# Patient Record
Sex: MALE
[De-identification: names, ages, dates, MRNs, and addresses within clinical notes are randomized per-mention and may not be internally consistent; named-entity substitution may affect disease eponyms.]

---

## 2019-10-28 PROBLEM — Z00.00 ENCOUNTER FOR PREVENTIVE HEALTH EXAMINATION: Status: ACTIVE | Noted: 2019-10-28

## 2019-11-14 ENCOUNTER — APPOINTMENT (OUTPATIENT)
Dept: SURGERY | Facility: CLINIC | Age: 54
End: 2019-11-14
Payer: COMMERCIAL

## 2019-11-14 VITALS
HEIGHT: 65 IN | WEIGHT: 152 LBS | DIASTOLIC BLOOD PRESSURE: 81 MMHG | BODY MASS INDEX: 25.33 KG/M2 | HEART RATE: 62 BPM | SYSTOLIC BLOOD PRESSURE: 123 MMHG

## 2019-11-14 DIAGNOSIS — Z87.09 PERSONAL HISTORY OF OTHER DISEASES OF THE RESPIRATORY SYSTEM: ICD-10-CM

## 2019-11-14 DIAGNOSIS — Z78.9 OTHER SPECIFIED HEALTH STATUS: ICD-10-CM

## 2019-11-14 DIAGNOSIS — Z80.52 FAMILY HISTORY OF MALIGNANT NEOPLASM OF BLADDER: ICD-10-CM

## 2019-11-14 DIAGNOSIS — Z86.39 PERSONAL HISTORY OF OTHER ENDOCRINE, NUTRITIONAL AND METABOLIC DISEASE: ICD-10-CM

## 2019-11-14 PROCEDURE — 99203 OFFICE O/P NEW LOW 30 MIN: CPT

## 2019-11-14 PROCEDURE — 36415 COLL VENOUS BLD VENIPUNCTURE: CPT

## 2019-11-14 RX ORDER — MONTELUKAST SODIUM 10 MG/1
10 TABLET, FILM COATED ORAL
Refills: 0 | Status: ACTIVE | COMMUNITY

## 2019-11-14 RX ORDER — BUDESONIDE AND FORMOTEROL FUMARATE DIHYDRATE 160; 4.5 UG/1; UG/1
AEROSOL RESPIRATORY (INHALATION)
Refills: 0 | Status: ACTIVE | COMMUNITY

## 2019-11-15 LAB
T3 SERPL-MCNC: 95 NG/DL
T4 FREE SERPL-MCNC: 1.1 NG/DL
THYROGLOB AB SERPL-ACNC: <20 IU/ML
THYROPEROXIDASE AB SERPL IA-ACNC: <10 IU/ML
TSH SERPL-ACNC: 2.05 UIU/ML

## 2019-11-16 NOTE — PHYSICAL EXAM
[de-identified] : no palpable thyroid nodules [Laryngoscopy Performed] : laryngoscopy was performed, see procedure section for findings [Midline] : located in midline position [Normal] : orientation to person, place, and time: normal [de-identified] : clear salivary flow [de-identified] : indirect  laryngoscopy shows normal vocal cord mobility bilaterally with no lesions noted

## 2019-11-16 NOTE — CONSULT LETTER
[Dear  ___] : Dear  [unfilled], [Consult Letter:] : I had the pleasure of evaluating your patient, [unfilled]. [Please see my note below.] : Please see my note below. [Consult Closing:] : Thank you very much for allowing me to participate in the care of this patient.  If you have any questions, please do not hesitate to contact me. [Sincerely,] : Sincerely, [FreeTextEntry2] : Dr. Brent Franklin [FreeTextEntry3] : Zion Felder MD, FACS\par System Director, Endocrine Surgery\par [facility]\par

## 2019-11-16 NOTE — ASSESSMENT
[FreeTextEntry1] : discussed that likely had infection with adenopathy which has since resolved.  no indication for any biopsies, radiographs or antibiotics. will observe thyroid nodule.  discussed that size of nodule is below the threshold for which fine needle aspiration cytology is generally recommended in the absence of any suspicious sonographic or clinical findings.  bloods drawn. to call next week for results. for sonogram next visit. to return earlier if any change

## 2019-11-16 NOTE — HISTORY OF PRESENT ILLNESS
[de-identified] : Pt c/o anterior neck discomfort and dysphagia.   denies hoarseness, SOB or RT exposure.   had URI treated with antibiotics with resolution of discomfort and swelling\par sonogram: Right isthmus 5 mm nodule\par TSH 1.36,  T4 1.07

## 2019-11-19 ENCOUNTER — RESULT REVIEW (OUTPATIENT)
Age: 54
End: 2019-11-19

## 2020-05-21 ENCOUNTER — APPOINTMENT (OUTPATIENT)
Dept: SURGERY | Facility: CLINIC | Age: 55
End: 2020-05-21
Payer: COMMERCIAL

## 2020-05-21 DIAGNOSIS — E04.1 NONTOXIC SINGLE THYROID NODULE: ICD-10-CM

## 2020-05-21 PROCEDURE — 99213 OFFICE O/P EST LOW 20 MIN: CPT

## 2020-05-21 NOTE — PHYSICAL EXAM
[de-identified] : no palpable thyroid nodules [Midline] : located in midline position [Laryngoscopy Performed] : laryngoscopy was performed, see procedure section for findings [de-identified] : clear salivary flow [Normal] : orientation to person, place, and time: normal

## 2020-05-21 NOTE — ASSESSMENT
[FreeTextEntry1] : suspect symptoms related to GERD. advised to avoid caffeine and other irritants. sonogram next visit.to return earlier if any change. to f/u symptoms with ENT if no improvement

## 2020-05-21 NOTE — HISTORY OF PRESENT ILLNESS
[de-identified] : prior evaluation of subcentimeter thyroid nodule and reactive adenopathy.  recovered from Covid-19 and oral thrush. still notes occasional need to clear throat. denies dysphagia, hoarseness or new lesions. recent thyroid sonogram stable

## 2020-05-21 NOTE — PROCEDURE
[None] : none [Normal] : normal [de-identified] : slight arytenoid erythema [Lesion(s)] : no lesions

## 2021-05-06 ENCOUNTER — NON-APPOINTMENT (OUTPATIENT)
Age: 56
End: 2021-05-06

## 2021-05-25 ENCOUNTER — NON-APPOINTMENT (OUTPATIENT)
Age: 56
End: 2021-05-25

## 2021-06-16 ENCOUNTER — NON-APPOINTMENT (OUTPATIENT)
Age: 56
End: 2021-06-16

## 2021-06-21 ENCOUNTER — NON-APPOINTMENT (OUTPATIENT)
Age: 56
End: 2021-06-21

## 2025-09-16 ENCOUNTER — APPOINTMENT (OUTPATIENT)
Dept: NEUROSURGERY | Facility: CLINIC | Age: 60
End: 2025-09-16
Payer: COMMERCIAL

## 2025-09-16 VITALS
OXYGEN SATURATION: 96 % | DIASTOLIC BLOOD PRESSURE: 87 MMHG | BODY MASS INDEX: 24.99 KG/M2 | HEIGHT: 65 IN | WEIGHT: 150 LBS | SYSTOLIC BLOOD PRESSURE: 145 MMHG | HEART RATE: 56 BPM | RESPIRATION RATE: 16 BRPM

## 2025-09-16 DIAGNOSIS — M54.16 RADICULOPATHY, LUMBAR REGION: ICD-10-CM

## 2025-09-16 PROCEDURE — 99202 OFFICE O/P NEW SF 15 MIN: CPT
